# Patient Record
Sex: FEMALE | Race: WHITE | NOT HISPANIC OR LATINO | Employment: UNEMPLOYED | URBAN - METROPOLITAN AREA
[De-identification: names, ages, dates, MRNs, and addresses within clinical notes are randomized per-mention and may not be internally consistent; named-entity substitution may affect disease eponyms.]

---

## 2023-07-27 ENCOUNTER — HOSPITAL ENCOUNTER (OUTPATIENT)
Dept: DATA CONVERSION | Facility: HOSPITAL | Age: 58
End: 2023-07-27
Attending: PAIN MEDICINE | Admitting: PAIN MEDICINE
Payer: COMMERCIAL

## 2023-07-27 DIAGNOSIS — M47.816 SPONDYLOSIS WITHOUT MYELOPATHY OR RADICULOPATHY, LUMBAR REGION: ICD-10-CM

## 2023-09-29 VITALS — HEIGHT: 66 IN | WEIGHT: 160.27 LBS | BODY MASS INDEX: 25.76 KG/M2

## 2023-10-02 NOTE — OP NOTE
PROCEDURE DETAILS    Preoperative Diagnosis:  Lumbar spondylosis, M47.816    Postoperative Diagnosis:  Lumbar spondylosis, M47.816    Surgeon: Faith Khan  Resident/Fellow/Other Assistant: Melly Pierce    Procedure:  Medial nerve branch block bilateral L3-L4, L4-L5  Anesthesia: No anesthesiologist associated with this case  Estimated Blood Loss: 0  Findings: None         Operative Report:   Clinical Note  The patient is here today to receive diagnostic medial nerve branch block to assist with pain.    Procedure Note  The patient was taken to the procedure room, was placed into a prone position. The area was prepped and draped sterilely in the normal fashion. The patient was throughout the procedure monitored by the nursing staff. The skin and subcutaneous tissue was  anesthetized with 1% lidocaine. Then 22-gauge spinal needles were introduced into the approximation of the medial nerve branches at the above mentioned level, confirmed in AP and oblique view with negative aspiration of heme and CSF. The patient received  0.5 mL of 0.5% Marcaine per site. The patient tolerated the procedure well, was taken to the recovery room, allowed to recover for sufficient amount of time and then was discharged home in stable condition. The patient was advised to followup with the  [facility] to reassess the improvement and determine the need for the radiofrequency ablation.    Thank you for allowing me to participate in the care of this patient.                        Attestation:   Note Completion:  Attending Attestation I performed the procedure without a resident         Electronic Signatures:  Faith Khan)  (Signed 27-Jul-2023 13:23)   Authored: Post-Operative Note, Chart Review, Note Completion      Last Updated: 27-Jul-2023 13:23 by Faith Khan)

## 2023-10-16 DIAGNOSIS — M47.816 SPONDYLOSIS OF LUMBAR JOINT: Primary | ICD-10-CM

## 2023-10-26 ENCOUNTER — TELEPHONE (OUTPATIENT)
Dept: PAIN MEDICINE | Facility: CLINIC | Age: 58
End: 2023-10-26
Payer: COMMERCIAL

## 2023-10-26 DIAGNOSIS — M96.1 POSTLAMINECTOMY SYNDROME, LUMBAR REGION: Primary | ICD-10-CM

## 2023-10-26 RX ORDER — TIZANIDINE 4 MG/1
4 TABLET ORAL 3 TIMES DAILY PRN
COMMUNITY
End: 2023-10-26 | Stop reason: SDUPTHER

## 2023-10-26 RX ORDER — TIZANIDINE 4 MG/1
4 TABLET ORAL 3 TIMES DAILY PRN
Qty: 30 TABLET | Refills: 2 | Status: SHIPPED | OUTPATIENT
Start: 2023-10-26 | End: 2023-10-27 | Stop reason: SDUPTHER

## 2023-10-27 DIAGNOSIS — M96.1 POSTLAMINECTOMY SYNDROME, LUMBAR REGION: ICD-10-CM

## 2023-10-27 RX ORDER — TIZANIDINE 4 MG/1
4 TABLET ORAL 3 TIMES DAILY PRN
Qty: 30 TABLET | Refills: 2 | Status: SHIPPED | OUTPATIENT
Start: 2023-10-27 | End: 2023-11-26

## 2023-11-07 ENCOUNTER — HOSPITAL ENCOUNTER (OUTPATIENT)
Dept: PAIN MEDICINE | Facility: CLINIC | Age: 58
Discharge: HOME | End: 2023-11-07
Payer: COMMERCIAL

## 2023-11-07 ENCOUNTER — APPOINTMENT (OUTPATIENT)
Dept: PAIN MEDICINE | Facility: CLINIC | Age: 58
End: 2023-11-07
Payer: COMMERCIAL

## 2023-11-07 VITALS
TEMPERATURE: 96.8 F | RESPIRATION RATE: 20 BRPM | HEART RATE: 72 BPM | DIASTOLIC BLOOD PRESSURE: 79 MMHG | SYSTOLIC BLOOD PRESSURE: 122 MMHG | OXYGEN SATURATION: 95 %

## 2023-11-07 PROBLEM — S82.892A CLOSED FRACTURE OF LEFT ANKLE: Status: ACTIVE | Noted: 2023-11-07

## 2023-11-07 PROBLEM — M54.16 LUMBAR RADICULOPATHY: Status: ACTIVE | Noted: 2023-11-07

## 2023-11-07 PROBLEM — M96.1 POSTLAMINECTOMY SYNDROME, LUMBAR: Status: ACTIVE | Noted: 2023-11-07

## 2023-11-07 PROBLEM — M54.50 LUMBAGO: Status: ACTIVE | Noted: 2023-11-07

## 2023-11-07 RX ORDER — LISINOPRIL 20 MG/1
20 TABLET ORAL DAILY
COMMUNITY
Start: 2023-10-31

## 2023-11-07 RX ORDER — ENALAPRIL MALEATE 5 MG/1
TABLET ORAL
COMMUNITY

## 2023-11-07 RX ORDER — HYDROCODONE BITARTRATE AND ACETAMINOPHEN 5; 325 MG/1; MG/1
1 TABLET ORAL 4 TIMES DAILY
COMMUNITY
Start: 2023-05-02

## 2023-11-07 RX ORDER — ASPIRIN 325 MG
1 TABLET, DELAYED RELEASE (ENTERIC COATED) ORAL
COMMUNITY
Start: 2022-03-29

## 2023-11-07 RX ORDER — IPRATROPIUM BROMIDE 0.5 MG/2.5ML
SOLUTION RESPIRATORY (INHALATION)
COMMUNITY

## 2023-11-07 RX ORDER — LEVOTHYROXINE SODIUM 50 UG/1
TABLET ORAL
COMMUNITY
Start: 2023-05-12

## 2023-11-07 RX ORDER — MECLIZINE HCL 12.5 MG 12.5 MG/1
TABLET ORAL
COMMUNITY

## 2023-11-07 RX ORDER — HYDROCHLOROTHIAZIDE 50 MG/1
50 TABLET ORAL DAILY
COMMUNITY
Start: 2023-10-31

## 2023-11-07 RX ORDER — GABAPENTIN 300 MG/1
300 CAPSULE ORAL
COMMUNITY

## 2023-11-07 RX ORDER — NALOXONE HYDROCHLORIDE 4 MG/.1ML
SPRAY NASAL
COMMUNITY
Start: 2020-03-05

## 2023-11-07 RX ORDER — ATORVASTATIN CALCIUM 40 MG/1
40 TABLET, FILM COATED ORAL NIGHTLY
COMMUNITY

## 2023-11-07 RX ORDER — ATENOLOL 50 MG/1
50 TABLET ORAL DAILY
COMMUNITY

## 2023-11-07 RX ORDER — TIOTROPIUM BROMIDE 18 UG/1
CAPSULE ORAL; RESPIRATORY (INHALATION)
COMMUNITY
Start: 2023-02-14

## 2023-11-07 RX ORDER — CARBOXYMETHYLCELLULOSE SODIUM 0.5 %
1 DROPPERETTE, SINGLE-USE DROP DISPENSER OPHTHALMIC (EYE) 4 TIMES DAILY
COMMUNITY
Start: 2023-04-17

## 2023-11-07 RX ORDER — BUDESONIDE AND FORMOTEROL FUMARATE DIHYDRATE 160; 4.5 UG/1; UG/1
AEROSOL RESPIRATORY (INHALATION)
COMMUNITY
Start: 2022-11-25

## 2023-11-07 RX ORDER — ROFLUMILAST 250 UG/1
TABLET ORAL
COMMUNITY

## 2023-11-07 RX ORDER — NAPROXEN 500 MG/1
500 TABLET ORAL
COMMUNITY
Start: 2019-09-05

## 2023-11-07 RX ORDER — ALBUTEROL SULFATE 90 UG/1
AEROSOL, METERED RESPIRATORY (INHALATION)
COMMUNITY

## 2023-11-07 RX ORDER — FLUTICASONE PROPIONATE AND SALMETEROL 500; 50 UG/1; UG/1
POWDER RESPIRATORY (INHALATION)
COMMUNITY

## 2023-11-07 RX ORDER — HYDROCODONE BITARTRATE AND ACETAMINOPHEN 7.5; 325 MG/1; MG/1
1 TABLET ORAL 4 TIMES DAILY
COMMUNITY

## 2023-11-07 RX ORDER — ACETAMINOPHEN 325 MG/1
TABLET ORAL
COMMUNITY

## 2023-11-07 RX ORDER — ENALAPRIL MALEATE AND HYDROCHLOROTHIAZIDE 10; 25 MG/1; MG/1
2 TABLET ORAL DAILY
COMMUNITY
Start: 2023-05-12

## 2023-11-07 ASSESSMENT — PAIN SCALES - GENERAL
PAINLEVEL_OUTOF10: 6
PAINLEVEL_OUTOF10: 6

## 2023-11-07 ASSESSMENT — PAIN - FUNCTIONAL ASSESSMENT
PAIN_FUNCTIONAL_ASSESSMENT: 0-10
PAIN_FUNCTIONAL_ASSESSMENT: 0-10

## 2023-11-07 ASSESSMENT — COLUMBIA-SUICIDE SEVERITY RATING SCALE - C-SSRS
6. HAVE YOU EVER DONE ANYTHING, STARTED TO DO ANYTHING, OR PREPARED TO DO ANYTHING TO END YOUR LIFE?: NO
2. HAVE YOU ACTUALLY HAD ANY THOUGHTS OF KILLING YOURSELF?: NO
1. IN THE PAST MONTH, HAVE YOU WISHED YOU WERE DEAD OR WISHED YOU COULD GO TO SLEEP AND NOT WAKE UP?: NO

## 2023-12-06 ENCOUNTER — TELEPHONE (OUTPATIENT)
Dept: PAIN MEDICINE | Facility: CLINIC | Age: 58
End: 2023-12-06
Payer: COMMERCIAL

## 2023-12-19 ENCOUNTER — OFFICE VISIT (OUTPATIENT)
Dept: PAIN MEDICINE | Facility: CLINIC | Age: 58
End: 2023-12-19
Payer: COMMERCIAL

## 2023-12-19 ENCOUNTER — APPOINTMENT (OUTPATIENT)
Dept: PAIN MEDICINE | Facility: CLINIC | Age: 58
End: 2023-12-19
Payer: COMMERCIAL

## 2023-12-19 VITALS
SYSTOLIC BLOOD PRESSURE: 135 MMHG | RESPIRATION RATE: 18 BRPM | DIASTOLIC BLOOD PRESSURE: 76 MMHG | HEART RATE: 66 BPM | TEMPERATURE: 97.2 F

## 2023-12-19 DIAGNOSIS — M62.838 MUSCLE SPASMS OF BOTH LOWER EXTREMITIES: ICD-10-CM

## 2023-12-19 DIAGNOSIS — G89.29 CHRONIC BILATERAL LOW BACK PAIN WITH RIGHT-SIDED SCIATICA: Primary | ICD-10-CM

## 2023-12-19 DIAGNOSIS — M54.41 CHRONIC BILATERAL LOW BACK PAIN WITH RIGHT-SIDED SCIATICA: Primary | ICD-10-CM

## 2023-12-19 DIAGNOSIS — M47.816 LUMBAR SPONDYLOSIS: ICD-10-CM

## 2023-12-19 DIAGNOSIS — M54.10 RADICULAR PAIN OF RIGHT LOWER EXTREMITY: ICD-10-CM

## 2023-12-19 PROCEDURE — 99213 OFFICE O/P EST LOW 20 MIN: CPT | Performed by: NURSE PRACTITIONER

## 2023-12-19 PROCEDURE — 99214 OFFICE O/P EST MOD 30 MIN: CPT | Mod: ZK | Performed by: NURSE PRACTITIONER

## 2023-12-19 RX ORDER — GABAPENTIN 300 MG/1
300 CAPSULE ORAL
Qty: 150 CAPSULE | Refills: 2 | Status: SHIPPED | OUTPATIENT
Start: 2023-12-19 | End: 2024-03-18

## 2023-12-19 RX ORDER — HYDROCODONE BITARTRATE AND ACETAMINOPHEN 7.5; 325 MG/1; MG/1
1 TABLET ORAL EVERY 6 HOURS PRN
Qty: 120 TABLET | Refills: 0 | Status: SHIPPED | OUTPATIENT
Start: 2024-01-28 | End: 2023-12-19

## 2023-12-19 RX ORDER — HYDROCODONE BITARTRATE AND ACETAMINOPHEN 7.5; 325 MG/1; MG/1
1 TABLET ORAL EVERY 6 HOURS PRN
Qty: 120 TABLET | Refills: 0 | Status: SHIPPED | OUTPATIENT
Start: 2024-01-28 | End: 2024-02-27

## 2023-12-19 RX ORDER — HYDROCODONE BITARTRATE AND ACETAMINOPHEN 7.5; 325 MG/1; MG/1
1 TABLET ORAL EVERY 6 HOURS PRN
Qty: 120 TABLET | Refills: 0 | Status: SHIPPED | OUTPATIENT
Start: 2024-02-27 | End: 2023-12-19

## 2023-12-19 RX ORDER — HYDROCODONE BITARTRATE AND ACETAMINOPHEN 7.5; 325 MG/1; MG/1
1 TABLET ORAL EVERY 6 HOURS PRN
Qty: 120 TABLET | Refills: 0 | Status: SHIPPED | OUTPATIENT
Start: 2024-02-27 | End: 2024-03-28

## 2023-12-19 RX ORDER — HYDROCODONE BITARTRATE AND ACETAMINOPHEN 7.5; 325 MG/1; MG/1
1 TABLET ORAL EVERY 6 HOURS PRN
Qty: 120 TABLET | Refills: 0 | Status: SHIPPED | OUTPATIENT
Start: 2023-12-29 | End: 2024-01-28

## 2023-12-19 RX ORDER — HYDROCODONE BITARTRATE AND ACETAMINOPHEN 7.5; 325 MG/1; MG/1
1 TABLET ORAL EVERY 6 HOURS PRN
Qty: 120 TABLET | Refills: 0 | Status: SHIPPED | OUTPATIENT
Start: 2023-12-29 | End: 2023-12-19

## 2023-12-19 RX ORDER — TIZANIDINE 4 MG/1
4 TABLET ORAL EVERY 8 HOURS PRN
Qty: 90 TABLET | Refills: 0 | Status: SHIPPED | OUTPATIENT
Start: 2023-12-19 | End: 2024-01-18

## 2023-12-19 ASSESSMENT — PAIN SCALES - GENERAL
PAINLEVEL_OUTOF10: 4
PAINLEVEL: 4

## 2023-12-19 ASSESSMENT — COLUMBIA-SUICIDE SEVERITY RATING SCALE - C-SSRS
2. HAVE YOU ACTUALLY HAD ANY THOUGHTS OF KILLING YOURSELF?: NO
6. HAVE YOU EVER DONE ANYTHING, STARTED TO DO ANYTHING, OR PREPARED TO DO ANYTHING TO END YOUR LIFE?: NO
1. IN THE PAST MONTH, HAVE YOU WISHED YOU WERE DEAD OR WISHED YOU COULD GO TO SLEEP AND NOT WAKE UP?: NO

## 2023-12-19 ASSESSMENT — PAIN - FUNCTIONAL ASSESSMENT: PAIN_FUNCTIONAL_ASSESSMENT: 0-10

## 2023-12-19 NOTE — PROGRESS NOTES
Subjective   Patient ID: Helena Caraballo is a 58 y.o. female who presents for Med Refill.  HPI  58 years old, female she is here today for a follow up/refill of medications. She is known in this clinic because of chronic back pain.  She is maintained on Norco, Gabapentin, Tizanidine and confirms that they are still moderately beneficial in controlling her pain issues. Her level of pain at this time is about 4/10.  She is able to Function with activities of daily living independently.      Review of Systems   Review of systems x 10 is negative.   No recent injury or falls reported.   No recent change in medical condition reported.   No recent weakness reported.   Still able to control bowel and bladder function.   Denies fever, cough, shortness of breath recently.   No interval change with medication/health issues reported.    Objective   Physical Exam  Awake,alert, no acute distress, appropriate.  Spine is of normal curvature.  Full ROM on all 4 extremities, sensation and motor intact, no vascular compromise.  No pedal edema, normal gait.  Skin warm, dry, intact, turgor is normal.  Occasional numbness, tingling of lower extremities.  /76   Pulse 66   Temp 36.2 °C (97.2 °F)   Resp 18       Assessment/Plan     I have personally reviewed the OARRS report for this patient. I have considered the risk of abuse, dependence, addiction and diversion.  I believe that it is clinically appropriate for this patient  to be prescribed this medication based on documented diagnosis.  Continue Hydrocodone, Gabapentin, Tizanidine as prescribed.  Continue doing safe guidelines for preventing spread of Corona virus.  Follow up in 3 months time or as needed basis  Reschedule RFA lumbar area for pain control.  Explained plan to this patient, and patient verbalized understanding and agreement with the plan. If there is questions or concerns, please feel free to contact me to clarify.    Chronic back pain associated with lumbago,  lumbar spondylosis, lumbar radiculopathy, post laminectomy syndrome.         Gretchen Dennis, APRN-CNP 12/19/23 9:16 AM

## 2024-02-13 ENCOUNTER — HOSPITAL ENCOUNTER (OUTPATIENT)
Dept: RADIOLOGY | Facility: HOSPITAL | Age: 59
Discharge: HOME | End: 2024-02-13
Payer: COMMERCIAL

## 2024-02-13 ENCOUNTER — HOSPITAL ENCOUNTER (OUTPATIENT)
Dept: PAIN MEDICINE | Facility: CLINIC | Age: 59
Discharge: HOME | End: 2024-02-13
Payer: COMMERCIAL

## 2024-02-13 VITALS
DIASTOLIC BLOOD PRESSURE: 82 MMHG | OXYGEN SATURATION: 95 % | RESPIRATION RATE: 18 BRPM | SYSTOLIC BLOOD PRESSURE: 126 MMHG | TEMPERATURE: 96.8 F | HEART RATE: 72 BPM

## 2024-02-13 DIAGNOSIS — M47.816 LUMBAR SPONDYLOSIS: ICD-10-CM

## 2024-02-13 PROCEDURE — 64635 DESTROY LUMB/SAC FACET JNT: CPT | Mod: 50 | Performed by: PAIN MEDICINE

## 2024-02-13 PROCEDURE — 64635 DESTROY LUMB/SAC FACET JNT: CPT | Performed by: PAIN MEDICINE

## 2024-02-13 PROCEDURE — 2500000004 HC RX 250 GENERAL PHARMACY W/ HCPCS (ALT 636 FOR OP/ED): Performed by: PAIN MEDICINE

## 2024-02-13 PROCEDURE — 64636 DESTROY L/S FACET JNT ADDL: CPT | Performed by: PAIN MEDICINE

## 2024-02-13 PROCEDURE — 99152 MOD SED SAME PHYS/QHP 5/>YRS: CPT | Performed by: PAIN MEDICINE

## 2024-02-13 PROCEDURE — 2500000005 HC RX 250 GENERAL PHARMACY W/O HCPCS: Performed by: PAIN MEDICINE

## 2024-02-13 RX ORDER — MIDAZOLAM HYDROCHLORIDE 1 MG/ML
1 INJECTION INTRAMUSCULAR; INTRAVENOUS
Status: DISCONTINUED | OUTPATIENT
Start: 2024-02-13 | End: 2024-02-14 | Stop reason: HOSPADM

## 2024-02-13 RX ORDER — BUPIVACAINE HYDROCHLORIDE 5 MG/ML
10 INJECTION, SOLUTION PERINEURAL ONCE
Status: COMPLETED | OUTPATIENT
Start: 2024-02-13 | End: 2024-02-13

## 2024-02-13 RX ORDER — LIDOCAINE IN NACL,ISO-OSMOT/PF 30 MG/3 ML
10 SYRINGE (ML) INJECTION ONCE
Status: COMPLETED | OUTPATIENT
Start: 2024-02-13 | End: 2024-02-13

## 2024-02-13 RX ADMIN — Medication 100 MG: at 11:59

## 2024-02-13 RX ADMIN — MIDAZOLAM HYDROCHLORIDE 1 MG: 1 INJECTION INTRAMUSCULAR; INTRAVENOUS at 11:56

## 2024-02-13 RX ADMIN — BUPIVACAINE HYDROCHLORIDE 50 MG: 5 INJECTION, SOLUTION PERINEURAL at 12:04

## 2024-02-13 ASSESSMENT — PAIN - FUNCTIONAL ASSESSMENT: PAIN_FUNCTIONAL_ASSESSMENT: 0-10

## 2024-02-13 ASSESSMENT — PAIN SCALES - GENERAL
PAINLEVEL_OUTOF10: 0 - NO PAIN
PAINLEVEL_OUTOF10: 5 - MODERATE PAIN

## 2024-02-13 NOTE — DISCHARGE INSTRUCTIONS
Post-injection instructions FOR Radiofrequency Ablation    Your radiofrequency ablation was completed today is not unusual to have some exacerbation of the pain before you get better.  Radiofrequency ablation takes an average of 2 to 3 weeks before it completely starts showing full results      Activity:  RETURN TO NORMAL ACTIVITY once the sedation is completely worn off do not sign any legal document for the first 24 hours do not drive or operate machinery for the first 24 hours until the sedation effect is completely worn off    Bandages: Remove after 24 hours     Showering/Bathing: You may shower after bandage is removed     May use ice at the site of the injection for the first 24 hours      Call the OFFICE immediately: if you notice:     Excessive bleeding from procedure site (brisk bright red bleeding from the site or bleeding that soaks the bandages or does not stop)   Severe headache  Inability to walk, leg or arm weakness or numbness that is worse after the procedure   Uncontrolled pain   New urinary or fecal incontinence   Signs of infection: Fever above 101.5F, redness, swelling, pus or drainage from the site    Please contact the pain clinic at [phone]  in one week to report results or with any further questions or concerns

## 2024-02-13 NOTE — H&P
History Of Present Illness  Helena Caraballo is a 58 y.o. female presenting with chronic back pain here today to receive radiofrequency ablation of the medial nerve branches of the lumbar spine area     Past Medical History  Past Medical History:   Diagnosis Date    Other conditions influencing health status     Breast Cancer    Other conditions influencing health status     Uterine Cancer    Other conditions influencing health status     Lung Involvement In Disease Classified Elsewhere    Personal history of other diseases of the circulatory system     History of hypertension    Personal history of other diseases of urinary system     History of chronic kidney disease    Personal history of pneumonia (recurrent)     History of pneumonia       Surgical History  Past Surgical History:   Procedure Laterality Date    ANTERIOR CRUCIATE LIGAMENT REPAIR  07/25/2013    Primary Repair Of Knee Ligament Cruciate Anterior    BREAST LUMPECTOMY  07/25/2013    Right Breast Lumpectomy    HYSTERECTOMY  07/25/2013    Hysterectomy    NEPHRECTOMY  07/25/2013    Nephrectomy Left    OTHER SURGICAL HISTORY  06/13/2019    Back surgery        Social History  She reports that she has never smoked. She does not have any smokeless tobacco history on file. No history on file for alcohol use and drug use.    Family History  Family History   Problem Relation Name Age of Onset    Other (chronic kidney disease) Other      Diabetes Other      Other (fibrocystic disease of breast) Other      Hypertension Other      Other (lung involvement in disease classified) Other          Allergies  Iodinated contrast media    Review of Systems   All 13 systems were reviewed and are within normal levels except as noted below or per HPI. Positive and pertinent negative responses are noted below or in the HPI   Denied any fever or chills. No weight loss and no night sweats. No cough or sputum production. No diarrhea   No constipation  No bladder and bowel  incontinence and no other changes in bladder and bowel. No skin changes.   Denied opioids diversion and abuse and denies alcoholism. Denies overuse of  pain medications.   Physical Exam       Past medical history no interval changes has been noted    On physical examination    General   Alert, oriented x3 pleasant and cooperative. Does not look in any major distress.    HEENT  Pupils normal in size. Ears, nose, mouth, and throat appear to be in normal condition.  Head atraumatic      No signs of sedation or signs of withdrawal apparent.    Psychiatric   No signs of depression apparent.    Neuro   No focal neurological deficit apparent. Ambulation at baseline.      Respiratory  No respiratory distress     Abdomen  no distention     Skin  No skin markings supportive of recent IV drug usage .    Cardiovascular  Regular rate and rhythm    Last Recorded Vitals  Blood pressure 136/87, pulse 73, temperature 36 °C (96.8 °F), temperature source Temporal, resp. rate 20, SpO2 97 %.    Relevant Results         Assessment/Plan   Active Problems:  There are no active Hospital Problems.             Here today for radiofrequency ablation of the medial nerve branches of the lumbar spine area L3-L4 L4-L5 bilaterally      Faith Khan MD

## 2024-02-13 NOTE — OP NOTE
Surgical Indications  The patient is here today to receive a radiofrequency ablation of the bilateral L3-L4 L4-L5 nerve to assist with the pain condition.  Versed 2 mg     Operative Report  The patient was taken to the procedure room, was placed into a prone positioning. The skin was prepped and draped sterilely in the normal fashion. Under fluoroscopic guidance the medial nerve root branches were identified and the patient was throughout the procedure monitored by the nursing staff. The skin and subcutaneous tissues were anesthetized with 1% lidocaine. Then 20-gauge RF needles were introduced into the approximation of the medial nerve branches at the above mentioned level and confirmed in AP and oblique view, with negative aspiration of heme and CSF, with positive sensory stimulation and negative motor stimulation. Then the patient received 1 mL of 0.5% bupivacaine per site and radiofrequency ablation at temperature of 80°C for 90 seconds was achieved. Patient tolerated the procedure well, was taken to the recovery room, allowed to recover for a sufficient amount of time and then was discharged home in stable condition. The patient was advised to followup with the [facility] to reassess the improvement on as-needed basis. Take you for allowing me to participate in the care of this patient.

## 2024-03-19 ENCOUNTER — OFFICE VISIT (OUTPATIENT)
Dept: PAIN MEDICINE | Facility: CLINIC | Age: 59
End: 2024-03-19
Payer: COMMERCIAL

## 2024-03-19 ENCOUNTER — TELEPHONE (OUTPATIENT)
Dept: PAIN MEDICINE | Facility: CLINIC | Age: 59
End: 2024-03-19
Payer: COMMERCIAL

## 2024-03-19 DIAGNOSIS — G89.29 CHRONIC BILATERAL LOW BACK PAIN WITH RIGHT-SIDED SCIATICA: Primary | ICD-10-CM

## 2024-03-19 DIAGNOSIS — M54.41 CHRONIC BILATERAL LOW BACK PAIN WITH RIGHT-SIDED SCIATICA: Primary | ICD-10-CM

## 2024-03-19 DIAGNOSIS — M96.1 LUMBAR POSTLAMINECTOMY SYNDROME: Primary | ICD-10-CM

## 2024-03-19 PROCEDURE — 99214 OFFICE O/P EST MOD 30 MIN: CPT | Performed by: NURSE PRACTITIONER

## 2024-03-19 PROCEDURE — 99214 OFFICE O/P EST MOD 30 MIN: CPT | Mod: ZK | Performed by: NURSE PRACTITIONER

## 2024-03-19 RX ORDER — TIZANIDINE 4 MG/1
4 TABLET ORAL EVERY 8 HOURS PRN
Qty: 90 TABLET | Refills: 2 | Status: SHIPPED | OUTPATIENT
Start: 2024-03-19 | End: 2024-06-17

## 2024-03-19 RX ORDER — HYDROCODONE BITARTRATE AND ACETAMINOPHEN 7.5; 325 MG/1; MG/1
1 TABLET ORAL EVERY 6 HOURS PRN
Qty: 120 TABLET | Refills: 0 | Status: SHIPPED | OUTPATIENT
Start: 2024-03-28 | End: 2024-04-27

## 2024-03-19 RX ORDER — HYDROCODONE BITARTRATE AND ACETAMINOPHEN 7.5; 325 MG/1; MG/1
1 TABLET ORAL EVERY 6 HOURS PRN
Qty: 120 TABLET | Refills: 0 | Status: SHIPPED | OUTPATIENT
Start: 2024-04-27 | End: 2024-05-27

## 2024-03-19 RX ORDER — HYDROCODONE BITARTRATE AND ACETAMINOPHEN 7.5; 325 MG/1; MG/1
1 TABLET ORAL EVERY 6 HOURS PRN
Qty: 120 TABLET | Refills: 0 | Status: SHIPPED | OUTPATIENT
Start: 2024-05-27 | End: 2024-05-24 | Stop reason: RX

## 2024-03-19 ASSESSMENT — COLUMBIA-SUICIDE SEVERITY RATING SCALE - C-SSRS
6. HAVE YOU EVER DONE ANYTHING, STARTED TO DO ANYTHING, OR PREPARED TO DO ANYTHING TO END YOUR LIFE?: NO
1. IN THE PAST MONTH, HAVE YOU WISHED YOU WERE DEAD OR WISHED YOU COULD GO TO SLEEP AND NOT WAKE UP?: NO
2. HAVE YOU ACTUALLY HAD ANY THOUGHTS OF KILLING YOURSELF?: NO

## 2024-03-19 ASSESSMENT — PATIENT HEALTH QUESTIONNAIRE - PHQ9
SUM OF ALL RESPONSES TO PHQ9 QUESTIONS 1 AND 2: 0
2. FEELING DOWN, DEPRESSED OR HOPELESS: NOT AT ALL
1. LITTLE INTEREST OR PLEASURE IN DOING THINGS: NOT AT ALL

## 2024-03-19 ASSESSMENT — PAIN - FUNCTIONAL ASSESSMENT: PAIN_FUNCTIONAL_ASSESSMENT: 0-10

## 2024-03-19 ASSESSMENT — PAIN DESCRIPTION - DESCRIPTORS: DESCRIPTORS: ACHING;BURNING;STABBING

## 2024-03-19 ASSESSMENT — ENCOUNTER SYMPTOMS: LOSS OF SENSATION IN FEET: 1

## 2024-03-19 ASSESSMENT — PAIN SCALES - GENERAL: PAINLEVEL_OUTOF10: 6

## 2024-03-19 NOTE — H&P
Subjective   Patient ID: Helena Caraballo is a 58 y.o. female who presents for Med Refill.  HPI  58 years old, female she is here today for a follow up/refill of medications. She is known in this clinic because of chronic back pain.  She is maintained on Norco, Gabapentin, Tizanidine and confirms that they are still moderately beneficial in controlling her pain issues. Her level of pain at this time is about 6 to 7/10, mostly in her back. She recently had RFA of the lumbar area done on 2/13/2024, she reports that she had about less than 50% reduction in pain.  Her level of pain at this time is about 6 to 7/10.  She is able to Function with activities of daily living independently.  Review of Systems   Review of systems x 10 is negative.   No recent injury or falls reported.   No recent change in medical condition reported.   No recent weakness reported.   Still able to control bowel and bladder function.   Denies fever, cough, shortness of breath recently.   No interval change with medication/health issues reported.    Objective   Physical Exam  Awake,alert, no acute distress, appropriate.  Spine is of normal curvature.  Full ROM on all 4 extremities, sensation and motor intact, no vascular compromise.  No pedal edema, normal gait.  Skin warm, dry, intact, turgor is normal.  Occasional numbness, tingling of lower extremities.  There were no vitals taken for this visit.    Assessment/Plan     I have personally reviewed the OARRS report for this patient. I have considered the risk of abuse, dependence, addiction and diversion.  I believe that it is clinically appropriate for this patient  to be prescribed this medication based on documented diagnosis.  Continue Hydrocodone, Gabapentin, Tizanidine as prescribed.  Continue doing safe guidelines for preventing spread of Corona virus.  Follow up in 3 months time or as needed basis  She is requesting for steroid injection for her back.  Explained plan to this patient, and  patient verbalized understanding and agreement with the plan. If there is questions or concerns, please feel free to contact me to clarify.    Chronic back pain associated with lumbago, lumbar spondylosis, lumbar radiculopathy, post laminectomy syndrome.          Gretchen Dennis, JUDY-CNP 03/19/24 11:47 AM

## 2024-05-22 ENCOUNTER — TELEPHONE (OUTPATIENT)
Dept: PAIN MEDICINE | Facility: CLINIC | Age: 59
End: 2024-05-22
Payer: COMMERCIAL

## 2024-05-22 NOTE — TELEPHONE ENCOUNTER
Hi, patient is requesting a early fill on  her  hydrocodone acetaminophne on Saturday 5/25 because her pharmacy is closed on 5/26 and 5/27.

## 2024-05-24 ENCOUNTER — TELEPHONE (OUTPATIENT)
Dept: PAIN MEDICINE | Facility: CLINIC | Age: 59
End: 2024-05-24
Payer: COMMERCIAL

## 2024-05-24 DIAGNOSIS — G89.29 CHRONIC BILATERAL LOW BACK PAIN WITH RIGHT-SIDED SCIATICA: ICD-10-CM

## 2024-05-24 DIAGNOSIS — M54.41 CHRONIC BILATERAL LOW BACK PAIN WITH RIGHT-SIDED SCIATICA: ICD-10-CM

## 2024-05-24 RX ORDER — HYDROCODONE BITARTRATE AND ACETAMINOPHEN 7.5; 325 MG/1; MG/1
1 TABLET ORAL EVERY 6 HOURS PRN
Qty: 120 TABLET | Refills: 0 | Status: SHIPPED | OUTPATIENT
Start: 2024-05-25 | End: 2024-06-24

## 2024-05-24 NOTE — TELEPHONE ENCOUNTER
Patient is due for medication refills on Monday 5/27/24. Pharmacy will be closed Sunday and Monday this week and she will not have enough medication to make it till Tuesday, please advice. Can you please call in early fill?

## 2024-05-26 NOTE — TELEPHONE ENCOUNTER
I called pharmacy on Friday 5/24 2024, spoke to ruth Higginbotham, regarding early refill since  They will be closed on 5/26 and 5/27/2024. She states she will place a note to that effect.

## 2024-06-18 ENCOUNTER — OFFICE VISIT (OUTPATIENT)
Dept: PAIN MEDICINE | Facility: CLINIC | Age: 59
End: 2024-06-18
Payer: COMMERCIAL

## 2024-06-18 VITALS
TEMPERATURE: 96.9 F | DIASTOLIC BLOOD PRESSURE: 69 MMHG | SYSTOLIC BLOOD PRESSURE: 111 MMHG | HEART RATE: 91 BPM | RESPIRATION RATE: 18 BRPM | OXYGEN SATURATION: 93 %

## 2024-06-18 DIAGNOSIS — M54.41 CHRONIC BILATERAL LOW BACK PAIN WITH RIGHT-SIDED SCIATICA: Primary | ICD-10-CM

## 2024-06-18 DIAGNOSIS — G89.29 CHRONIC BILATERAL LOW BACK PAIN WITH RIGHT-SIDED SCIATICA: Primary | ICD-10-CM

## 2024-06-18 PROCEDURE — 99213 OFFICE O/P EST LOW 20 MIN: CPT | Performed by: NURSE PRACTITIONER

## 2024-06-18 RX ORDER — HYDROCODONE BITARTRATE AND ACETAMINOPHEN 7.5; 325 MG/1; MG/1
1 TABLET ORAL EVERY 6 HOURS PRN
Qty: 120 TABLET | Refills: 0 | Status: SHIPPED | OUTPATIENT
Start: 2024-07-24 | End: 2024-08-23

## 2024-06-18 RX ORDER — HYDROCODONE BITARTRATE AND ACETAMINOPHEN 7.5; 325 MG/1; MG/1
1 TABLET ORAL EVERY 6 HOURS PRN
Qty: 120 TABLET | Refills: 0 | Status: SHIPPED | OUTPATIENT
Start: 2024-06-24 | End: 2024-07-24

## 2024-06-18 RX ORDER — HYDROCODONE BITARTRATE AND ACETAMINOPHEN 7.5; 325 MG/1; MG/1
1 TABLET ORAL EVERY 6 HOURS PRN
Qty: 120 TABLET | Refills: 0 | Status: SHIPPED | OUTPATIENT
Start: 2024-08-23 | End: 2024-09-22

## 2024-06-18 ASSESSMENT — PATIENT HEALTH QUESTIONNAIRE - PHQ9
2. FEELING DOWN, DEPRESSED OR HOPELESS: NOT AT ALL
SUM OF ALL RESPONSES TO PHQ9 QUESTIONS 1 AND 2: 0
1. LITTLE INTEREST OR PLEASURE IN DOING THINGS: NOT AT ALL

## 2024-06-18 ASSESSMENT — PAIN - FUNCTIONAL ASSESSMENT: PAIN_FUNCTIONAL_ASSESSMENT: 0-10

## 2024-06-18 ASSESSMENT — COLUMBIA-SUICIDE SEVERITY RATING SCALE - C-SSRS
2. HAVE YOU ACTUALLY HAD ANY THOUGHTS OF KILLING YOURSELF?: NO
1. IN THE PAST MONTH, HAVE YOU WISHED YOU WERE DEAD OR WISHED YOU COULD GO TO SLEEP AND NOT WAKE UP?: NO
6. HAVE YOU EVER DONE ANYTHING, STARTED TO DO ANYTHING, OR PREPARED TO DO ANYTHING TO END YOUR LIFE?: NO

## 2024-06-18 ASSESSMENT — PAIN DESCRIPTION - DESCRIPTORS: DESCRIPTORS: ACHING;BURNING;SHARP

## 2024-06-18 ASSESSMENT — ENCOUNTER SYMPTOMS
OCCASIONAL FEELINGS OF UNSTEADINESS: 0
LOSS OF SENSATION IN FEET: 1

## 2024-06-18 ASSESSMENT — PAIN SCALES - GENERAL: PAINLEVEL_OUTOF10: 6

## 2024-06-19 NOTE — H&P
History Of Present Illness  Helena Caraballo is a 59 y.o. female presenting for follow up, refill of medication.   She is known in this clinic because of chronic back pain.  She is maintained on Norco, Gabapentin, Tizanidine and confirms that they are still moderately beneficial in controlling her pain issues. Her level of pain at this time is about  6/10. OARRS obtained and reviewed, no abuse or misuse with prescribed medication noted.  She described positive response to the present medication therapy. Denies any side effects associated with the usage of the medication.   She is able to Function with activities of daily living independently.   Past Medical History  Past Medical History:   Diagnosis Date    Other conditions influencing health status     Breast Cancer    Other conditions influencing health status     Uterine Cancer    Other conditions influencing health status     Lung Involvement In Disease Classified Elsewhere    Personal history of other diseases of the circulatory system     History of hypertension    Personal history of other diseases of urinary system     History of chronic kidney disease    Personal history of pneumonia (recurrent)     History of pneumonia       Surgical History  Past Surgical History:   Procedure Laterality Date    ANTERIOR CRUCIATE LIGAMENT REPAIR  07/25/2013    Primary Repair Of Knee Ligament Cruciate Anterior    BREAST LUMPECTOMY  07/25/2013    Right Breast Lumpectomy    HYSTERECTOMY  07/25/2013    Hysterectomy    NEPHRECTOMY  07/25/2013    Nephrectomy Left    OTHER SURGICAL HISTORY  06/13/2019    Back surgery        Social History  She reports that she has never smoked. She does not have any smokeless tobacco history on file. No history on file for alcohol use and drug use.    Family History  Family History   Problem Relation Name Age of Onset    Other (chronic kidney disease) Other      Diabetes Other      Other (fibrocystic disease of breast) Other      Hypertension  Other      Other (lung involvement in disease classified) Other          Allergies  Iodinated contrast media    Review of Systems    Review of systems x 10 is negative.   No recent injury or falls reported.   No recent change in medical condition reported.   No recent weakness reported.   Still able to control bowel and bladder function.  Denies any problem with constipation.   Denies fever, cough, shortness of breath recently.   No interval change with medication/health issues reported.  Denies opioids diversion and abuse. Denies overuse of pain medications.    Physical Exam   Awake,alert, no acute distress, appropriate.  Spine is of normal curvature.  Full ROM on all 4 extremities, sensation and motor intact, no vascular compromise.  No pedal edema, normal gait.  Skin warm, dry, intact, turgor is normal.  Occasional numbness, tingling of lower extremities.  Last Recorded Vitals  Blood pressure 111/69, pulse 91, temperature 36.1 °C (96.9 °F), temperature source Temporal, resp. rate 18, SpO2 93%.    Relevant Results  No recent imaging noted.     Assessment/Plan     I have personally reviewed the OARRS report for this patient. I have considered the risk of abuse, dependence, addiction and diversion.  I believe that it is clinically appropriate for this patient  to be prescribed this medication based on documented diagnosis.  Continue Hydrocodone, Gabapentin, Tizanidine as prescribed.  She believes usage of medication improves her quality of life and allows her to participate day to day activity.  Follow up in 3 months time or as needed basis  Explained plan to this patient, and patient verbalized understanding and agreement with the plan. If there is questions or concerns, please feel free to contact me to clarify at [phone], M-F 8-4 PM.     Chronic back pain associated with lumbago, lumbar spondylosis, lumbar radiculopathy, post laminectomy syndrome.     I spent 30 minutes in the professional and overall care  of this patient.      Gretchen Dennis, APRN-CNP

## 2024-07-25 DIAGNOSIS — M62.838 MUSCLE SPASMS OF BOTH LOWER EXTREMITIES: ICD-10-CM

## 2024-07-25 RX ORDER — TIZANIDINE 4 MG/1
4 TABLET ORAL EVERY 8 HOURS PRN
Qty: 90 TABLET | Refills: 0 | Status: SHIPPED | OUTPATIENT
Start: 2024-07-25 | End: 2024-08-24

## 2024-09-18 ENCOUNTER — OFFICE VISIT (OUTPATIENT)
Dept: PAIN MEDICINE | Facility: CLINIC | Age: 59
End: 2024-09-18
Payer: COMMERCIAL

## 2024-09-18 VITALS
HEART RATE: 86 BPM | DIASTOLIC BLOOD PRESSURE: 84 MMHG | SYSTOLIC BLOOD PRESSURE: 131 MMHG | RESPIRATION RATE: 18 BRPM | TEMPERATURE: 97.8 F | OXYGEN SATURATION: 96 %

## 2024-09-18 DIAGNOSIS — G89.29 CHRONIC BILATERAL LOW BACK PAIN WITH RIGHT-SIDED SCIATICA: ICD-10-CM

## 2024-09-18 DIAGNOSIS — M96.1 POSTLAMINECTOMY SYNDROME OF LUMBAR REGION: Primary | ICD-10-CM

## 2024-09-18 DIAGNOSIS — M54.41 CHRONIC BILATERAL LOW BACK PAIN WITH RIGHT-SIDED SCIATICA: ICD-10-CM

## 2024-09-18 PROCEDURE — 99213 OFFICE O/P EST LOW 20 MIN: CPT

## 2024-09-18 RX ORDER — TIZANIDINE 4 MG/1
4 TABLET ORAL EVERY 8 HOURS PRN
Qty: 90 TABLET | Refills: 2 | Status: SHIPPED | OUTPATIENT
Start: 2024-09-18 | End: 2024-12-17

## 2024-09-18 RX ORDER — HYDROCODONE BITARTRATE AND ACETAMINOPHEN 7.5; 325 MG/1; MG/1
1 TABLET ORAL EVERY 6 HOURS PRN
Qty: 120 TABLET | Refills: 0 | Status: SHIPPED | OUTPATIENT
Start: 2024-09-21 | End: 2024-10-21

## 2024-09-18 RX ORDER — HYDROCODONE BITARTRATE AND ACETAMINOPHEN 7.5; 325 MG/1; MG/1
1 TABLET ORAL EVERY 6 HOURS PRN
Qty: 120 TABLET | Refills: 0 | Status: SHIPPED | OUTPATIENT
Start: 2024-10-21 | End: 2024-11-20

## 2024-09-18 RX ORDER — HYDROCODONE BITARTRATE AND ACETAMINOPHEN 7.5; 325 MG/1; MG/1
1 TABLET ORAL EVERY 6 HOURS PRN
Qty: 120 TABLET | Refills: 0 | Status: SHIPPED | OUTPATIENT
Start: 2024-11-20 | End: 2024-12-20

## 2024-09-18 ASSESSMENT — PAIN SCALES - GENERAL: PAINLEVEL: 6

## 2024-09-18 NOTE — PROGRESS NOTES
Subjective   Patient ID: Helena Caraballo is a 59 y.o. female who presents for Med Refill.  HPI    58 yo female presents today for medication refill. She is known to this clinic for chronic low back pain and is maintained on hydrocodone- acetaminophen 7.5- 325mg every 6 hours as needed and tizanidine 4mg 3 times daily. She confirms that the medications are still beneficial to her. Pain today is rated 6/10 in the center back with radiation to the right leg. The patient denies any side effects associated with the usage of the medication patient described the medication improving the quality of life and allowing participation in day-to-day activity patient denies otherwise any new complaint patient is requesting a refill. Patient denies usage of any other opiate. Patient denies usage of any recreational drugs. Patient confirms that the opiate prescription is being used as prescribed.       Review of Systems  All 13 systems were reviewed and are within normal levels except as noted below or per HPI. Positive and pertinent negative responses are noted below or in the HPI   Denied any fever or chills. No weight loss and no night sweats. No cough or sputum production. No diarrhea   Denies constipation.   No bladder and bowel incontinence and no other changes in bladder and bowel. No skin changes. Reports tiredness and fatigability only if the pain is not controlled.   Denied opioids diversion and abuse and denies alcoholism. Denies overuse of the pain medications.      Objective   Physical Exam  General   Alert and oriented x4, pleasant and cooperative.      HEENT  Pupils are equal and normal in size. Ears, nose, mouth, and throat appear to be WNL.  Head atraumatic, symmetric.      No signs of sedation or signs of withdrawal apparent.     Psychiatric   No signs of depression apparent. Appropriate mood and affect.     Neuro   No focal neurological deficit apparent. Ambulation at baseline.      Respiratory  No respiratory  distress, respirations equal and unlabored.     Abdomen  Soft and nontender, no distention noted.     Skin  Warm, dry and intact. No skin markings supportive of recent IV drug usage .            Assessment/Plan     58 yo female with history and physical exam supportive of chronic low back pain associated with lumbar spondylosis, lumbar radiculopathy and lumbar postlaminectomy syndrome.     I have personally reviewed the OARRS report for this patient. I have considered the risks of abuse, dependence, addiction and diversion. I believe that it is clinically appropriate for this patient to be prescribed this medication based on documented diagnosis.  I believe the benefits of the continuation of the opiate outweighs the risk. Patient has described positive response to the present medication therapy. Patient denies any side effects associated with the usage of the medication. Patient did not elicit any signs supportive of misuse or abuse. The review of the [place] [employer] prescription is not suggestive of any worrisome pattern. Patient believes the usage of this medication has improved the quality of life and allow him to participate in day-to-day activity. Therefore  I recommend the continuation of this medication and I will be refilling the medication prescription.  Continue to take hydrocodone- acetaminophen and tizanidine as prescribed.   Follow up in 3 months or as needed.   Explained plan to this patient, and patient verbalized understanding and agreement with the plan.     Please do not hesitate to contact the pain clinic after your visit with any questions or concerns at  M-F 8-4 pm     Patient was reminded not to share medications, not to take prescription medications that were not prescribed to the patient, and not to increase or change dose without consulting the pain clinic. I advised the patient to always take the least amount of medication needed to keep symptoms under control.           Christie Gabriel, JUDY-CNP 09/18/24 2:58 PM

## 2024-12-19 ENCOUNTER — OFFICE VISIT (OUTPATIENT)
Dept: PAIN MEDICINE | Facility: CLINIC | Age: 59
End: 2024-12-19
Payer: COMMERCIAL

## 2024-12-19 VITALS — DIASTOLIC BLOOD PRESSURE: 71 MMHG | TEMPERATURE: 97.5 F | HEART RATE: 76 BPM | SYSTOLIC BLOOD PRESSURE: 135 MMHG

## 2024-12-19 DIAGNOSIS — M96.1 POSTLAMINECTOMY SYNDROME OF LUMBAR REGION: Primary | ICD-10-CM

## 2024-12-19 PROCEDURE — 99215 OFFICE O/P EST HI 40 MIN: CPT | Performed by: NURSE PRACTITIONER

## 2024-12-19 RX ORDER — HYDROCODONE BITARTRATE AND ACETAMINOPHEN 7.5; 325 MG/1; MG/1
1 TABLET ORAL EVERY 6 HOURS PRN
Qty: 120 TABLET | Refills: 0 | Status: SHIPPED | OUTPATIENT
Start: 2025-01-19 | End: 2025-02-18

## 2024-12-19 RX ORDER — HYDROCODONE BITARTRATE AND ACETAMINOPHEN 7.5; 325 MG/1; MG/1
1 TABLET ORAL EVERY 6 HOURS PRN
Qty: 120 TABLET | Refills: 0 | Status: SHIPPED | OUTPATIENT
Start: 2024-12-20 | End: 2025-01-19

## 2024-12-19 RX ORDER — GABAPENTIN 300 MG/1
300 CAPSULE ORAL 2 TIMES DAILY
Qty: 60 CAPSULE | Refills: 2 | Status: SHIPPED | OUTPATIENT
Start: 2024-12-19 | End: 2025-03-19

## 2024-12-19 RX ORDER — HYDROCODONE BITARTRATE AND ACETAMINOPHEN 7.5; 325 MG/1; MG/1
1 TABLET ORAL EVERY 6 HOURS PRN
Qty: 120 TABLET | Refills: 0 | Status: SHIPPED | OUTPATIENT
Start: 2025-02-18 | End: 2025-03-20

## 2024-12-19 ASSESSMENT — PAIN SCALES - GENERAL: PAINLEVEL_OUTOF10: 5 - MODERATE PAIN

## 2024-12-19 ASSESSMENT — PAIN - FUNCTIONAL ASSESSMENT: PAIN_FUNCTIONAL_ASSESSMENT: 0-10

## 2024-12-19 ASSESSMENT — PAIN DESCRIPTION - DESCRIPTORS: DESCRIPTORS: BURNING;STABBING

## 2024-12-19 NOTE — H&P
History Of Present Illness  Helena Caraballo is a 59 y.o. female presenting for refill of medications.  She is known in this clinic because of chronic back pain. She is maintained on Hydrocodone/acetaminophen 7.5 mg/325 mg, Gabapentin 300 mg which she takes only as needed BID and Tizanidine 4 mg every 8 hours as needed for muscle spasms.  She described positive response to the present medication therapy. Denies any side effects associated with the usage of the medication.   OARRS obtained and reviewed, no abuse or misuse with prescribed medication noted.  She is able to perform activities of daily living independently.       Past Medical History  Past Medical History:   Diagnosis Date    Other conditions influencing health status     Breast Cancer    Other conditions influencing health status     Uterine Cancer    Other conditions influencing health status     Lung Involvement In Disease Classified Elsewhere    Personal history of other diseases of the circulatory system     History of hypertension    Personal history of other diseases of urinary system     History of chronic kidney disease    Personal history of pneumonia (recurrent)     History of pneumonia       Surgical History  Past Surgical History:   Procedure Laterality Date    ANTERIOR CRUCIATE LIGAMENT REPAIR  07/25/2013    Primary Repair Of Knee Ligament Cruciate Anterior    BREAST LUMPECTOMY  07/25/2013    Right Breast Lumpectomy    HYSTERECTOMY  07/25/2013    Hysterectomy    NEPHRECTOMY  07/25/2013    Nephrectomy Left    OTHER SURGICAL HISTORY  06/13/2019    Back surgery        Social History  She reports that she has never smoked. She does not have any smokeless tobacco history on file. No history on file for alcohol use and drug use.    Family History  Family History   Problem Relation Name Age of Onset    Other (chronic kidney disease) Other      Diabetes Other      Other (fibrocystic disease of breast) Other      Hypertension Other      Other  (lung involvement in disease classified) Other          Allergies  Iodinated contrast media    Review of Systems   Review of systems x 10 is negative.   No recent injury or falls reported.   No recent change in medical condition reported.   No recent weakness reported.   Still able to control bowel and bladder function.  Denies any problem with constipation.   Denies fever, cough, shortness of breath recently.   No interval change with medication/health issues reported.  Denies opioids diversion and abuse. Denies overuse of pain medications.     Physical Exam   Awake,alert, no acute distress, appropriate.  Spine is of normal curvature.  Full ROM on all 4 extremities, sensation and motor intact, no vascular compromise.  No pedal edema, normal gait.  Skin warm, dry, intact, turgor is normal.  Occasional numbness, tingling of lower extremities.  Last Recorded Vitals  Blood pressure 135/71, pulse 76, temperature 36.4 °C (97.5 °F).    Relevant Results  No recent imaging noted.  Drug screen done on 3/19/2024, result as expected.     Assessment/Plan     I have personally reviewed the OARRS report for this patient. I have considered the risk of abuse, dependence, addiction and diversion.  I believe that it is clinically appropriate for this patient  to be prescribed this medication based on documented diagnosis.  Based on the favorable effect in your quality of life and the positive effect in the ability to do daily activities of daily living, I feel feel that the benefits outweighs the risk, and I will continue the current regimen of medications.  Continue Hydrocodone, Gabapentin and Tizanidine as prescribed.  Follow up in 3 months time or as needed basis  Explained plan to this patient, and patient verbalized understanding and agreement with the plan. If there is questions or concerns, please feel free to contact me to clarify at [phone], M-F 8-4 PM.     Chronic back pain associated with lumbago, lumbar spondylosis,  lumbar radiculopathy, post laminectomy syndrome.        I spent 40 minutes in the professional and overall care of this patient.      Gretchen Dennis, APRN-CNP

## 2025-01-07 DIAGNOSIS — M96.1 POSTLAMINECTOMY SYNDROME OF LUMBAR REGION: ICD-10-CM

## 2025-01-07 RX ORDER — HYDROCODONE BITARTRATE AND ACETAMINOPHEN 7.5; 325 MG/1; MG/1
1 TABLET ORAL EVERY 6 HOURS PRN
Qty: 120 TABLET | Refills: 0 | Status: SHIPPED | OUTPATIENT
Start: 2025-01-18 | End: 2025-02-17

## 2025-01-14 DIAGNOSIS — M62.838 MUSCLE SPASMS OF BOTH LOWER EXTREMITIES: Primary | ICD-10-CM

## 2025-01-14 RX ORDER — TIZANIDINE 4 MG/1
4 TABLET ORAL EVERY 8 HOURS PRN
Qty: 90 TABLET | Refills: 0 | Status: SHIPPED | OUTPATIENT
Start: 2025-01-14 | End: 2025-01-16

## 2025-01-16 ENCOUNTER — TELEPHONE (OUTPATIENT)
Dept: PAIN MEDICINE | Facility: CLINIC | Age: 60
End: 2025-01-16
Payer: COMMERCIAL

## 2025-01-16 DIAGNOSIS — M62.838 MUSCLE SPASMS OF BOTH LOWER EXTREMITIES: Primary | ICD-10-CM

## 2025-01-16 RX ORDER — TIZANIDINE 4 MG/1
4 TABLET ORAL EVERY 8 HOURS PRN
Qty: 90 TABLET | Refills: 0 | Status: SHIPPED | OUTPATIENT
Start: 2025-01-16 | End: 2025-02-15

## 2025-02-28 DIAGNOSIS — M62.838 MUSCLE SPASMS OF BOTH LOWER EXTREMITIES: Primary | ICD-10-CM

## 2025-03-04 RX ORDER — TIZANIDINE 4 MG/1
4 TABLET ORAL EVERY 8 HOURS PRN
Qty: 90 TABLET | Refills: 0 | Status: SHIPPED | OUTPATIENT
Start: 2025-03-04

## 2025-03-13 ENCOUNTER — OFFICE VISIT (OUTPATIENT)
Dept: PAIN MEDICINE | Facility: CLINIC | Age: 60
End: 2025-03-13
Payer: COMMERCIAL

## 2025-03-13 VITALS — DIASTOLIC BLOOD PRESSURE: 91 MMHG | SYSTOLIC BLOOD PRESSURE: 149 MMHG | HEART RATE: 79 BPM | TEMPERATURE: 97.3 F

## 2025-03-13 DIAGNOSIS — M96.1 POSTLAMINECTOMY SYNDROME OF LUMBAR REGION: Primary | ICD-10-CM

## 2025-03-13 PROCEDURE — 99213 OFFICE O/P EST LOW 20 MIN: CPT | Performed by: NURSE PRACTITIONER

## 2025-03-13 RX ORDER — HYDROCODONE BITARTRATE AND ACETAMINOPHEN 7.5; 325 MG/1; MG/1
1 TABLET ORAL EVERY 6 HOURS PRN
Qty: 120 TABLET | Refills: 0 | Status: SHIPPED | OUTPATIENT
Start: 2025-05-19 | End: 2025-06-18

## 2025-03-13 RX ORDER — HYDROCODONE BITARTRATE AND ACETAMINOPHEN 7.5; 325 MG/1; MG/1
1 TABLET ORAL EVERY 6 HOURS PRN
Qty: 120 TABLET | Refills: 0 | Status: SHIPPED | OUTPATIENT
Start: 2025-04-19 | End: 2025-05-19

## 2025-03-13 RX ORDER — HYDROCODONE BITARTRATE AND ACETAMINOPHEN 7.5; 325 MG/1; MG/1
1 TABLET ORAL EVERY 6 HOURS PRN
Qty: 120 TABLET | Refills: 0 | Status: SHIPPED | OUTPATIENT
Start: 2025-03-20 | End: 2025-04-19

## 2025-03-13 ASSESSMENT — PAIN SCALES - GENERAL
PAINLEVEL_OUTOF10: 6
PAINLEVEL_OUTOF10: 6

## 2025-03-13 ASSESSMENT — PAIN - FUNCTIONAL ASSESSMENT: PAIN_FUNCTIONAL_ASSESSMENT: 0-10

## 2025-03-13 ASSESSMENT — PAIN DESCRIPTION - DESCRIPTORS: DESCRIPTORS: BURNING;STABBING

## 2025-03-13 NOTE — H&P
History Of Present Illness  Helena Caraballo is a 59 y.o. female presenting for refill of medications.  She is known in this clinic because of chronic back pain.  She is maintained on Norco, Gabapentin, Tizanidine and confirms that they are still moderately beneficial in controlling her pain issues. Her level of pain at this time is about  6/10 mostly in her back going down the right leg, described as constant, burning, stabbing type of discomfort. OARRS obtained and reviewed, no abuse or misuse with prescribed medication noted.  She described positive response to the present medication therapy. Denies any side effects associated with the usage of the medication.   She is able to perform activities of daily living independently.      Past Medical History  Past Medical History:   Diagnosis Date    Other conditions influencing health status     Breast Cancer    Other conditions influencing health status     Uterine Cancer    Other conditions influencing health status     Lung Involvement In Disease Classified Elsewhere    Personal history of other diseases of the circulatory system     History of hypertension    Personal history of other diseases of urinary system     History of chronic kidney disease    Personal history of pneumonia (recurrent)     History of pneumonia       Surgical History  Past Surgical History:   Procedure Laterality Date    ANTERIOR CRUCIATE LIGAMENT REPAIR  07/25/2013    Primary Repair Of Knee Ligament Cruciate Anterior    BREAST LUMPECTOMY  07/25/2013    Right Breast Lumpectomy    HYSTERECTOMY  07/25/2013    Hysterectomy    NEPHRECTOMY  07/25/2013    Nephrectomy Left    OTHER SURGICAL HISTORY  06/13/2019    Back surgery        Social History  She reports that she has never smoked. She does not have any smokeless tobacco history on file. No history on file for alcohol use and drug use.    Family History  Family History   Problem Relation Name Age of Onset    Other (chronic kidney disease) Other       Diabetes Other      Other (fibrocystic disease of breast) Other      Hypertension Other      Other (lung involvement in disease classified) Other          Allergies  Iodinated contrast media    Review of Systems   Review of systems x 10 is negative.   No recent injury or falls reported.   No recent change in medical condition reported.   No recent weakness reported.   Still able to control bowel and bladder function.  Denies any problem with constipation.   Denies fever, cough, shortness of breath recently.   No interval change with medication/health issues reported.  Denies opioids diversion and abuse. Denies overuse of pain medications.     Physical Exam  Awake,alert, no acute distress, appropriate.  Spine is of normal curvature.  Full ROM on all 4 extremities, sensation and motor intact, no vascular compromise.  No pedal edema, normal gait.  Skin warm, dry, intact, turgor is normal.  Occasional numbness, tingling of lower extremities.     Last Recorded Vitals  Blood pressure (!) 149/91, pulse 79, temperature 36.3 °C (97.3 °F).    Relevant Results  No recent imaging noted.     Assessment/Plan     59 years old, female with history and physical examination supportive of  chronic back pain associated with lumbago, lumbar spondylosis, lumbar radiculopathy, post laminectomy syndrome.       I have personally reviewed the OARRS report for this patient. I have considered the risk of abuse, dependence, addiction and diversion.  I believe that it is clinically appropriate for this patient  to be prescribed this medication based on documented diagnosis.  Continue Hydrocodone, Gabapentin, Tizanidine as prescribed.   Drug screen performed today.  She believes usage of medication improves her quality of life and allows her to participate day to day activity.  Follow up in 3 months time or as needed basis  Explained plan to this patient, and patient verbalized understanding and agreement with the plan. If there is questions  or concerns, please feel free to contact me to clarify at [phone], M-F 8-4 PM.       I spent 29 minutes in the professional and overall care of this patient.      Gretchen Dennis, JUDY-CNP

## 2025-03-17 LAB — QUEST FLEXITEST1 RESULTS:: NORMAL
